# Patient Record
Sex: FEMALE | Race: WHITE | HISPANIC OR LATINO | URBAN - METROPOLITAN AREA
[De-identification: names, ages, dates, MRNs, and addresses within clinical notes are randomized per-mention and may not be internally consistent; named-entity substitution may affect disease eponyms.]

---

## 2024-06-17 ENCOUNTER — OUTPATIENT (OUTPATIENT)
Dept: INPATIENT UNIT | Facility: HOSPITAL | Age: 34
LOS: 1 days | Discharge: HOME | End: 2024-06-17
Payer: MEDICAID

## 2024-06-17 VITALS — SYSTOLIC BLOOD PRESSURE: 108 MMHG | DIASTOLIC BLOOD PRESSURE: 65 MMHG | HEART RATE: 83 BPM

## 2024-06-17 VITALS
DIASTOLIC BLOOD PRESSURE: 65 MMHG | RESPIRATION RATE: 16 BRPM | HEART RATE: 83 BPM | TEMPERATURE: 98 F | SYSTOLIC BLOOD PRESSURE: 108 MMHG

## 2024-06-17 DIAGNOSIS — O26.899 OTHER SPECIFIED PREGNANCY RELATED CONDITIONS, UNSPECIFIED TRIMESTER: ICD-10-CM

## 2024-06-17 LAB
BLD GP AB SCN SERPL QL: SIGNIFICANT CHANGE UP
HIV 1 & 2 AB SERPL IA.RAPID: SIGNIFICANT CHANGE UP

## 2024-06-17 PROCEDURE — 83986 ASSAY PH BODY FLUID NOS: CPT | Mod: QW

## 2024-06-17 PROCEDURE — 87653 STREP B DNA AMP PROBE: CPT

## 2024-06-17 PROCEDURE — 86703 HIV-1/HIV-2 1 RESULT ANTBDY: CPT

## 2024-06-17 PROCEDURE — 59025 FETAL NON-STRESS TEST: CPT

## 2024-06-17 PROCEDURE — 87491 CHLMYD TRACH DNA AMP PROBE: CPT

## 2024-06-17 PROCEDURE — 83986 ASSAY PH BODY FLUID NOS: CPT

## 2024-06-17 PROCEDURE — 96360 HYDRATION IV INFUSION INIT: CPT

## 2024-06-17 PROCEDURE — 86901 BLOOD TYPING SEROLOGIC RH(D): CPT

## 2024-06-17 PROCEDURE — 87591 N.GONORRHOEAE DNA AMP PROB: CPT

## 2024-06-17 PROCEDURE — 99214 OFFICE O/P EST MOD 30 MIN: CPT

## 2024-06-17 PROCEDURE — 59025 FETAL NON-STRESS TEST: CPT | Mod: 26,59

## 2024-06-17 PROCEDURE — 99213 OFFICE O/P EST LOW 20 MIN: CPT | Mod: 25

## 2024-06-17 PROCEDURE — 36415 COLL VENOUS BLD VENIPUNCTURE: CPT

## 2024-06-17 PROCEDURE — 86900 BLOOD TYPING SEROLOGIC ABO: CPT

## 2024-06-17 PROCEDURE — 86850 RBC ANTIBODY SCREEN: CPT

## 2024-06-17 PROCEDURE — 87661 TRICHOMONAS VAGINALIS AMPLIF: CPT

## 2024-06-17 PROCEDURE — 87798 DETECT AGENT NOS DNA AMP: CPT

## 2024-06-17 PROCEDURE — 87801 DETECT AGNT MULT DNA AMPLI: CPT

## 2024-06-17 RX ORDER — SODIUM CHLORIDE 9 MG/ML
1000 INJECTION, SOLUTION INTRAVENOUS
Refills: 0 | Status: DISCONTINUED | OUTPATIENT
Start: 2024-06-17 | End: 2024-06-17

## 2024-06-17 NOTE — OB PROVIDER TRIAGE NOTE - NS_OBGYNHISTORY_OBGYN_ALL_OB_FT
x 1 37 wks, 2830 gms, no complications  reports (+) fibroid- unsure of location or size; not remarked upon during OB sonos; no pn chart available  (+) HSV II, last outbreak one year ago; was to start valtrex today

## 2024-06-17 NOTE — OB PROVIDER TRIAGE NOTE - NSHPPHYSICALEXAM_GEN_ALL_CORE
Vital Signs (24 Hrs):  T(C): 36.8 (06-17-24 @ 14:50), Max: 36.8 (06-17-24 @ 14:50)  HR: 83 (06-17-24 @ 14:52) (83 - 83)  BP: 108/65 (06-17-24 @ 14:52) (108/65 - 108/65)  RR: 16 (06-17-24 @ 14:50) (16 - 16)    Gen: NAD  Abd: gravid, soft NT  VE: l/c/p  intact   spec: thick white discharge  FHR Baby A:135/mod/+accels  FHY BabyB:135/mod/+accels  TOCO: irritability

## 2024-06-17 NOTE — OB PROVIDER TRIAGE NOTE - NSOBPROVIDERNOTE_OBGYN_ALL_OB_FT
33 yr old  at 34w2d with reassuring maternal and fetal well being not in PTL    vagintis and GBS cultures sent  Type and screen and rapid HIV sent  continue care with PMD and MFM  reviewed with pt PTL precautions  Valtrex rx to be sent to pharm by Dr Zimmerman to start prophylaxsis    Dr Zimmermna aware 33 yr old  at 34w2d with irregular ctx; r/o PTL     vagintis and GBS cultures sent  Type and screen and rapid HIV sent  Valtrex rx to be sent to pharm by Dr Zimmerman to start prophylaxsis  IV FLuid hydration  Reevalute VE in 2 hours      Dr Zimmerman aware 33 yr old  at 34w2d with irregular ctx; r/o PTL     vagintis and GBS cultures sent  Type and screen and rapid HIV sent  Valtrex rx to be sent to pharm by Dr Zimmerman to start prophylaxsis  IV FLuid hydration  Reevalute VE in 2 hours      Dr Zimmerman aware        ADDENCUM:  s/p prolonged monitoring, pt reports ctx are not at noticeable; VE remains unchanged.      Pt to dc to home  f/u PMD and MFM  increase po fluids  PTL precautions reviewed   Start valtrex prophylaxis    Dr Zimmerman aware

## 2024-06-17 NOTE — OB PROVIDER TRIAGE NOTE - HISTORY OF PRESENT ILLNESS
33 yr old  at 34w2d twin pregnancy, with c/o increased vaginal discharge, white, thicker without irritation or itching.  Also reports recurrent irregular cramping that has been happening intermittently over past few days, very wide spread, hard to quantify.  Pt was at [facility] for appt earlier, had normal sonos; was advised to come to be evaluated. Pt reports (+) FM, denies VB, lof.  Pt is (+) HSV II, last outbreak one year ago; was to start valtrex today.         33 yr old  at 34w2d di-di  twin pregnancy, with c/o increased vaginal discharge, white, thicker without irritation or itching.  Also reports recurrent irregular cramping that has been happening intermittently over past few days, very wide spread, hard to quantify.  Pt was at [facility] for appt earlier, had normal sonos; was advised to come to be evaluated. Pt reports (+) FM, denies VB, lof.  Pt is (+) HSV II, last outbreak one year ago; was to start valtrex today.

## 2024-06-17 NOTE — OB RN TRIAGE NOTE - NSOBDISCHARGEVS_OBGYN_ALL_OB
Asthma Attack Prevention, Adult      Although you may not be able to control the fact that you have asthma, you can take actions to prevent episodes of asthma (asthma attacks).      How can this condition affect me?    Asthma attacks (flare ups) can cause trouble breathing, wheezing, and coughing. They may keep you from doing activities you like to do.      What can increase my risk?    Coming into contact with things that cause asthma symptoms (asthma triggers) can put you at risk for an asthma attack. Common asthma triggers include:•Things you are allergic to (allergens), such as:  •Dust mite and cockroach droppings.      •Pet dander.      •Mold.      •Pollen from trees and grasses.      •Food allergies. This might be a specific food or added chemicals called sulfites.      •Irritants, such as:  •Weather changes including very cold, dry, or humid air.      •Smoke. This includes campfire smoke, air pollution, and tobacco smoke.      •Strong odors from aerosol sprays and fumes from perfume, candles, and household .      •Other triggers, such as:  •Certain medicines. This includes NSAIDs, such as ibuprofen and aspirin.      •Viral respiratory infections (colds), including runny nose (rhinitis) or infection in the sinuses (sinusitis).      •Activity including exercise, laughing, or crying.      •Not using inhaled medicines (corticosteroids) as told.          What actions can I take to prevent an asthma attack?    •Stay healthy. Stay up to date on all immunizations as told by your health care provider, including the yearly flu (influenza) vaccine and pneumonia vaccine.      •Many asthma attacks can be prevented by carefully following your written asthma action plan.        Follow your asthma action plan      Work with your health care provider to create a written asthma action plan. This plan should include:  •A list of your asthma triggers and how to avoid or reduce them.      •A list of symptoms that you may have during an asthma attack.      •Information about which medicine to take, when to take the medicine, and how much of the medicine to take.      •Information to help you understand your peak flow measurements.      •Daily actions that you can take to prevent (control) your asthma symptoms.      •Contact information for your health care providers.      •If you have an asthma attack, act quickly. Follow the emergency steps on your written asthma action plan. This may prevent you from needing to go to the hospital.      Monitor your asthma. To do this:•Use your peak flow meter every morning and every evening for 2–3 weeks or as told by your health care provider.  •Record the results in a journal.      •A drop in your peak flow numbers on one or more days may mean that you are starting to have an asthma attack, even if you are not having symptoms.        •When you have asthma symptoms, write them down in a journal.      •Write down in your journal how often you need to use your fast-acting rescue inhaler. If you are using your rescue inhaler more often, it may mean that your asthma is not under control. Talk with your health care provider about adjusting your asthma treatment plan to help you prevent future asthma attacks and gain better control of your condition.      Lifestyle     •Avoid or reduce contact with known outdoor allergens by staying indoors, keeping windows closed, and using air conditioning when pollen and mold counts are high.      • Do not use any products that contain nicotine or tobacco, such as cigarettes, e-cigarettes, and chewing tobacco. If you need help quitting, ask your health care provider.      •If you are overweight, consider a weight-loss plan.      •Find ways to cope with stress and your feelings, such as mindfulness, relaxation, or breathing exercises.      •Ask your health care provider if a breathing exercise program (pulmonary rehabilitation) may be helpful to control symptoms and improve your quality of life.        Medicines      •Take over-the-counter and prescription medicines only as told by your health care provider.      • Do not stop taking your medicine and do not take less medicine even if you are doing well.    •Let your health care provider know:  •How often you use your rescue inhaler.      •How often you have symptoms when you are taking your regular medicines.      •If you wake up at night because of asthma symptoms.      •If you have more trouble with your breathing when you exercise.        Activity     •Do your normal activities as told by your health care provider. Ask your health care provider what activities are safe for you.    •Some people have asthma symptoms or more asthma symptoms when they exercise. This is called exercise-induced bronchoconstriction (EIB). If you have this problem, talk with your health care provider about how to manage EIB. Some tips to follow include:  •Use your fast-acting inhaler before exercise.      •Exercise indoors if it is very cold, humid, or the pollen and mold counts are high.      •Warm up and cool down before and after exercise.      •Stop exercising right away if your asthma symptoms start or get worse.          Where to find more information    •Asthma and Allergy Foundation of [place]: www.aafa.org      •Centers for Disease Control and Prevention: www.cdc.gov      •American Lung Association: www.lung.org      •National Heart, Lung, and Blood [place]: www.nhlbi.nih.gov      •World Health Organization: www.who.int        Get help right away if:    •You have followed your written asthma action plan and your symptoms are not improving.        Summary    •Asthma attacks (flare ups) can cause trouble breathing, wheezing, and coughing. They may keep you from doing activities you normally like to do.      •Work with your health care provider to create a written asthma action plan.      • Do not stop taking your medicine and do not take less medicine even if you are doing well.      • Do not use any products that contain nicotine or tobacco, such as cigarettes, e-cigarettes, and chewing tobacco. If you need help quitting, ask your health care provider.      This information is not intended to replace advice given to you by your health care provider. Make sure you discuss any questions you have with your health care provider. Confirmed that patient received an additional set of vital signs prior to discharge.

## 2024-06-19 DIAGNOSIS — O47.03 FALSE LABOR BEFORE 37 COMPLETED WEEKS OF GESTATION, THIRD TRIMESTER: ICD-10-CM

## 2024-06-19 DIAGNOSIS — O99.013 ANEMIA COMPLICATING PREGNANCY, THIRD TRIMESTER: ICD-10-CM

## 2024-06-19 DIAGNOSIS — O30.043 TWIN PREGNANCY, DICHORIONIC/DIAMNIOTIC, THIRD TRIMESTER: ICD-10-CM

## 2024-06-19 DIAGNOSIS — Z3A.34 34 WEEKS GESTATION OF PREGNANCY: ICD-10-CM

## 2024-06-19 DIAGNOSIS — D21.9 BENIGN NEOPLASM OF CONNECTIVE AND OTHER SOFT TISSUE, UNSPECIFIED: ICD-10-CM

## 2024-06-19 DIAGNOSIS — D50.9 IRON DEFICIENCY ANEMIA, UNSPECIFIED: ICD-10-CM

## 2024-06-19 DIAGNOSIS — Z03.71 ENCOUNTER FOR SUSPECTED PROBLEM WITH AMNIOTIC CAVITY AND MEMBRANE RULED OUT: ICD-10-CM

## 2024-06-19 DIAGNOSIS — O99.891 OTHER SPECIFIED DISEASES AND CONDITIONS COMPLICATING PREGNANCY: ICD-10-CM

## 2024-06-19 LAB
GROUP B BETA STREP DNA (PCR): DETECTED
SOURCE GROUP B STREP: SIGNIFICANT CHANGE UP

## 2024-06-20 LAB
A VAGINAE DNA VAG QL NAA+PROBE: SIGNIFICANT CHANGE UP
BVAB2 DNA VAG QL NAA+PROBE: SIGNIFICANT CHANGE UP
C ALBICANS DNA VAG QL NAA+PROBE: NEGATIVE — SIGNIFICANT CHANGE UP
C GLABRATA DNA VAG QL NAA+PROBE: NEGATIVE — SIGNIFICANT CHANGE UP
C KRUSEI DNA VAG QL NAA+PROBE: NEGATIVE — SIGNIFICANT CHANGE UP
C LUSITANIAE DNA VAG QL NAA+PROBE: NEGATIVE — SIGNIFICANT CHANGE UP
C TRACH RRNA SPEC QL NAA+PROBE: NEGATIVE — SIGNIFICANT CHANGE UP
CANDIDA DNA VAG QL NAA+PROBE: NEGATIVE — SIGNIFICANT CHANGE UP
MEGA1 DNA VAG QL NAA+PROBE: SIGNIFICANT CHANGE UP
N GONORRHOEA RRNA SPEC QL NAA+PROBE: NEGATIVE — SIGNIFICANT CHANGE UP
T VAGINALIS RRNA SPEC QL NAA+PROBE: NEGATIVE — SIGNIFICANT CHANGE UP